# Patient Record
(demographics unavailable — no encounter records)

---

## 2024-12-23 NOTE — HISTORY OF PRESENT ILLNESS
[de-identified] : Mr. MIN  is s/p  incarcerated umbilical hernia repair and left inguinal hernia     repair with mesh on 09/13/2024.  Today Mr. MIN offers no complaints. patient reports no fever, chills,  or  pain. Patient reports good bowel movements and appetite.

## 2024-12-23 NOTE — PLAN
[FreeTextEntry1] : Mr. MIN will follow up  if needed. Warning signs, follow up, and restrictions were discussed with the patient.

## 2024-12-23 NOTE — PHYSICAL EXAM
[Alert] : alert [Oriented to Person] : oriented to person [Oriented to Place] : oriented to place [Oriented to Time] : oriented to time [Calm] : calm [de-identified] :  left groin and umbilicus    no signs of   recurrence

## 2024-12-23 NOTE — DATA REVIEWED
Patient ID: [id number]     Chief Complaint: Follow-up (6 month check  up), Sore Throat, Headache, sinus congestion, and Hyperlipidemia (Stopped taking atorvastatin due to s/e)    HPI:     Mina Da Silva, a 67-year-old male, is here for a follow-up appointment after six months. He mentions that he discontinued atorvastatin because of side effects, specifically severe muscle aches and pains. Additionally, he has been suffering from a sore throat, headache, and sinus congestion since Sunday. He notes that he attended a birthday party on Sunday and began experiencing symptoms the following day. He has one grandchild who is also unwell. He denies having any fevers and has no other concerns today.    Past Medical History:   Diagnosis Date    Abdominal bloating     Actinic keratosis due to exposure to sunlight     Basal cell carcinoma (BCC)     Benign neoplasm of colon     Diverticulosis     Elevated LFTs     Essential tremor     History of hepatitis C virus infection     HTN (hypertension)     Internal hemorrhoids     Macrocytosis without anemia     Melanoma 07/10/2023    Personal history of colonic polyps 08/09/2016    s/p polypectomy Dr Tomas Casey    Snoring     Tubular adenoma of colon     Vitamin B12 deficiency       Past Surgical History:   Procedure Laterality Date    BASAL CELL CARCINOMA EXCISION  03/2023    Dr Charles Santamaria    CATARACT EXTRACTION  2020    CLOSURE OF DEFECT OF MOHS PROCEDURE Left 04/03/2023    upper lip - Dr Charles Santamaria    COLONOSCOPY  06/29/2022    Dr Tomas Casey    COLONOSCOPY W/ BIOPSIES AND POLYPECTOMY  08/09/2016    3 polyps - Dr Tomas Casey    EXCISION OF MELANOMA Right 07/10/2023    t[facility] - Dr Charles Santamaria    LUMBAR LAMINECTOMY WITH DISCECTOMY  02/18/2022    Dr Bruna Cesar    TONSILLECTOMY  1964        Social History     Socioeconomic History    Marital status:    Tobacco Use    Smoking status: Former     Current packs/day: 0.00     Average packs/day: 0.3 packs/day for 3.8 years (1.0  [No studies available for review at this time.] : No studies available for review at this time. ttl pk-yrs)     Types: Cigarettes     Start date: 2020     Quit date: 2024     Years since quittin.1    Smokeless tobacco: Never   Substance and Sexual Activity    Alcohol use: Not Currently    Drug use: Never    Sexual activity: Yes     Social Determinants of Health     Financial Resource Strain: Low Risk  (2023)    Overall Financial Resource Strain (CARDIA)     Difficulty of Paying Living Expenses: Not hard at all   Food Insecurity: No Food Insecurity (2023)    Hunger Vital Sign     Worried About Running Out of Food in the Last Year: Never true     Ran Out of Food in the Last Year: Never true   Transportation Needs: No Transportation Needs (2023)    PRAPARE - Transportation     Lack of Transportation (Medical): No     Lack of Transportation (Non-Medical): No   Physical Activity: Sufficiently Active (2023)    Exercise Vital Sign     Days of Exercise per Week: 5 days     Minutes of Exercise per Session: 30 min   Stress: No Stress Concern Present (2023)    [employer] - Occupational Stress Questionnaire     Feeling of Stress : Not at all   Housing Stability: Low Risk  (2023)    Housing Stability Vital Sign     Unable to Pay for Housing in the Last Year: No     Number of Places Lived in the Last Year: 1     Unstable Housing in the Last Year: No        Current Outpatient Medications   Medication Instructions    ASPIRIN CHILDRENS 81 mg, Oral, Daily    cyanocobalamin (VITAMIN B-12) 1,000 mcg, Oral, Daily    enalapril (VASOTEC) 10 MG tablet TAKE 1 TABLET BY MOUTH EVERY DAY    ezetimibe (ZETIA) 10 mg, Oral, Daily    ketoconazole (NIZORAL) 2 % shampoo APPLY TO SCALP FOR 5 MIN THEN RINSE. DO THIS DAILY FOR 1 WEEK THEN TWICE WEEKLY LONG-TERM    methylPREDNISolone (MEDROL DOSEPACK) 4 mg tablet use as directed    nitroGLYCERIN (NITROSTAT) 0.4 MG SL tablet Sublingual       Review of patient's allergies indicates:   Allergen Reactions    Codeine Hives     "    Patient Care Team:  Robert Welch DO as PCP - General (Family Medicine)  Lalo Mcdonnell MD as Consulting Physician (Cardiology)  Bruna Cesar MD as Consulting Physician (Neurosurgery)  Tomas Casey MD as Consulting Physician (Gastroenterology)  Hazel Kohli MD as Consulting Physician (Ophthalmology)  Aida Lombardo MD (Dermatology)  Charles Santamaria MD as Consulting Physician (Dermatology)  Charles Cristobal MD as Consulting Physician (Dermatology)     Subjective:     Review of Systems    12 point review of systems conducted, negative except as stated in the history of present illness. See HPI for details.    Objective:     Visit Vitals  /70 (BP Location: Right arm, Patient Position: Sitting, BP Method: Large (Automatic))   Pulse 75   Temp 98.2 °F (36.8 °C)   Resp 20   Ht 5' 8" (1.727 m)   Wt 70.7 kg (155 lb 12.8 oz)   SpO2 99%   BMI 23.69 kg/m²       Physical Exam  Vitals and nursing note reviewed.   Constitutional:       General: He is not in acute distress.     Appearance: He is not ill-appearing.   HENT:      Head: Normocephalic and atraumatic.      Right Ear: Tympanic membrane, ear canal and external ear normal.      Left Ear: Tympanic membrane, ear canal and external ear normal.      Nose: Congestion present.      Right Turbinates: Swollen.      Left Turbinates: Swollen.      Right Sinus: Right frontal sinus tenderness: sinus pressure.      Mouth/Throat:      Mouth: Mucous membranes are moist.      Pharynx: Oropharynx is clear.   Eyes:      General: No scleral icterus.     Extraocular Movements: Extraocular movements intact.      Conjunctiva/sclera: Conjunctivae normal.      Pupils: Pupils are equal, round, and reactive to light.   Neck:      Vascular: No carotid bruit.   Cardiovascular:      Rate and Rhythm: Normal rate and regular rhythm.      Heart sounds: No murmur heard.     No friction rub. No gallop.   Pulmonary:      Effort: Pulmonary effort is normal. No respiratory " distress.      Breath sounds: Normal breath sounds. No wheezing, rhonchi or rales.   Abdominal:      General: Abdomen is flat. Bowel sounds are normal. There is no distension.      Palpations: Abdomen is soft. There is no mass.      Tenderness: There is no abdominal tenderness.   Musculoskeletal:         General: Normal range of motion.      Cervical back: Normal range of motion and neck supple.   Skin:     General: Skin is warm and dry.   Neurological:      General: No focal deficit present.      Mental Status: He is alert.   Psychiatric:         Mood and Affect: Mood normal.         Labs Reviewed:     Chemistry:  Lab Results   Component Value Date     08/19/2024    K 4.3 08/19/2024    BUN 14.0 08/19/2024    CREATININE 0.83 08/19/2024    EGFRNORACEVR >60 08/19/2024    GLUCOSE 114 08/19/2024    CALCIUM 9.9 08/19/2024    ALKPHOS 67 08/19/2024    LABPROT 8.2 (H) 08/19/2024    ALBUMIN 4.0 08/19/2024    BILIDIR 0.6 (H) 09/23/2021    IBILI 1.20 09/23/2021    AST 17 08/19/2024    ALT 17 08/19/2024    TSH 2.526 02/16/2024    PSA 1.28 02/16/2024     Hematology:  Lab Results   Component Value Date    WBC 6.13 04/08/2024    HGB 14.5 04/08/2024    HCT 42.3 04/08/2024     04/08/2024     Lipid Panel:  Lab Results   Component Value Date    CHOL 232 (H) 08/19/2024    HDL 31 (L) 08/19/2024    .00 (H) 08/19/2024    TRIG 203 (H) 08/19/2024    TOTALCHOLEST 7 (H) 08/19/2024      Urine:  Lab Results   Component Value Date    APPEARANCEUA Clear 02/08/2023    SGUA >=1.030 02/08/2023    PROTEINUA Negative 02/08/2023    KETONESUA Trace (A) 02/08/2023    LEUKOCYTESUR Negative 02/08/2023    RBCUA 0-5 02/08/2023    WBCUA 0-2 02/08/2023    BACTERIA Rare 02/08/2023      Assessment:       ICD-10-CM ICD-9-CM   1. Myalgia due to statin  M79.10 729.1    T46.6X5A E942.2   2. Mixed hyperlipidemia  E78.2 272.2   3. Acute URI  J06.9 465.9     Plan:     1. Myalgia due to statin  -     ezetimibe (ZETIA) 10 mg tablet; Take 1 tablet (10  mg total) by mouth once daily.  Dispense: 90 tablet; Refill: 3    2. Mixed hyperlipidemia  Assessment & Plan:  Lab Results   Component Value Date    .00 (H) 08/19/2024    TRIG 203 (H) 08/19/2024    HDL 31 (L) 08/19/2024    TOTALCHOLEST 7 (H) 08/19/2024     Start Zetia 10 mg daily.   Recheck in 6 months.   Follow a low cholesterol, low saturated fat diet with less that 200mg of cholesterol a day.  Avoid fried foods and high saturated fats (high saturated fats less than 7% of calories).  Add Flax Seed/Fish Oil supplements to diet. Increase dietary fiber.  Regular exercise can reduce LDL and raise HDL. Stressed importance of physical activity 5 times per week for 30 minutes per day.     Orders:  -     ezetimibe (ZETIA) 10 mg tablet; Take 1 tablet (10 mg total) by mouth once daily.  Dispense: 90 tablet; Refill: 3  -     Lipid Panel; Future; Expected date: 02/17/2025  -     Comprehensive Metabolic Panel; Future; Expected date: 02/17/2025    3. Acute URI  Assessment & Plan:  COVID and flu negative.  Start Medrol Dose Pack.   Discussed with the patient that antibiotics are not warranted at this time.  If he is still having symptoms and starting to run fever on day 8, we can discuss possible antibiotic treatment.    Use OTC cold meds for symptoms (HTN and DM pt to avoid Sudafed or pseudoephedrine products).  Use OTC Tylenol or Advil for fever or body aches.  Get plenty of rest, eat a healthy diet, avoid alcohol and smoking.  Sore Throat: Use OTC lozenges or throat sprays, gargle with warm salt and water, warm tea with honey.  Nasal Congestion: Use OTC Mucinex D, humidifier or steamy shower.  Cough: Use Dextromethorphan/Doxylamine Cough Syrup at night.  Report fever greater than 101 F, breathing problems, painful or worsening cough, or changes in mucous (green, yellow, bloody).  Cover your mouth with coughing, avoid sharing cups or lip balm, wash your hand before eating or touching your face.    Orders:  -      COVID/FLU A&B PCR; Future; Expected date: 08/21/2024  -     methylPREDNISolone (MEDROL DOSEPACK) 4 mg tablet; use as directed  Dispense: 1 each; Refill: 0      Follow up in about 6 months (around 2/21/2025) for Medicare Wellness. In addition to their scheduled follow up, the patient has also been instructed to follow up on as needed basis.     Abel Asencio NP

## 2024-12-23 NOTE — HISTORY OF PRESENT ILLNESS
[de-identified] : Mr. MIN  is s/p  incarcerated umbilical hernia repair and left inguinal hernia     repair with mesh on 09/13/2024.  Today Mr. MIN offers no complaints. patient reports no fever, chills,  or  pain. Patient reports good bowel movements and appetite.

## 2024-12-23 NOTE — ASSESSMENT
[FreeTextEntry1] : Mr. MIN is doing well, with excellent post-operative recovery. All surgical incisions  healed well and as expected. There is no evidence of  complication or recurrence, and he is progressing as expected. Patient can return to normal activity without restrictions.  Patient's questions and concerns addressed to patient's satisfaction.